# Patient Record
Sex: MALE | Race: WHITE | NOT HISPANIC OR LATINO | URBAN - METROPOLITAN AREA
[De-identification: names, ages, dates, MRNs, and addresses within clinical notes are randomized per-mention and may not be internally consistent; named-entity substitution may affect disease eponyms.]

---

## 2017-06-02 ENCOUNTER — EMERGENCY (EMERGENCY)
Facility: HOSPITAL | Age: 52
LOS: 1 days | Discharge: HOME | End: 2017-06-02
Attending: EMERGENCY MEDICINE
Payer: COMMERCIAL

## 2017-06-02 VITALS
TEMPERATURE: 98 F | DIASTOLIC BLOOD PRESSURE: 87 MMHG | OXYGEN SATURATION: 96 % | RESPIRATION RATE: 18 BRPM | HEART RATE: 86 BPM | SYSTOLIC BLOOD PRESSURE: 135 MMHG

## 2017-06-02 PROCEDURE — 99283 EMERGENCY DEPT VISIT LOW MDM: CPT

## 2017-06-02 PROCEDURE — 73130 X-RAY EXAM OF HAND: CPT | Mod: 26,RT

## 2017-06-02 PROCEDURE — 99283 EMERGENCY DEPT VISIT LOW MDM: CPT | Mod: 25

## 2017-06-02 PROCEDURE — 73130 X-RAY EXAM OF HAND: CPT

## 2017-06-02 RX ORDER — IBUPROFEN 200 MG
600 TABLET ORAL ONCE
Qty: 0 | Refills: 0 | Status: COMPLETED | OUTPATIENT
Start: 2017-06-02 | End: 2017-06-02

## 2017-06-02 RX ADMIN — Medication 600 MILLIGRAM(S): at 19:46

## 2017-06-02 NOTE — ED PROVIDER NOTE - OBJECTIVE STATEMENT
51M presenting with right hand pain after mechanical trip and fall 1 day prior, patient fell onto outstretched right hand. +swelling to mid dorsum of right hand, no numbness/tingling or weakness of right hand. NO forearm or elbow pain on right. NO head injury. Patient has not taken anything for pain yet.

## 2017-06-02 NOTE — ED ADULT NURSE NOTE - OBJECTIVE STATEMENT
50 y/o male presented to the ED c/o rt hand swelling and 5/10 pain on pain scale s/p slip and fall yesterday. Pt is able to move rt hand and Pt has + radial pulse. Pt is A&Ox3, PERRL, lungs CTA B/L, abd. soft, NT, ND, skin W/D/I, MAEx4.

## 2017-06-02 NOTE — ED PROVIDER NOTE - PLAN OF CARE
There was no broken bones on the x-ray of your hand.     You may use Tylenol 650mg every 8 hours or Motrin 600mg every 8 hours as needed for pain.     You may use the wrist guard for comfort. Elevate the arm to reduce swelling.     Please see our orthopedics doctors in 1 week if your pain has not improved

## 2017-06-02 NOTE — ED PROVIDER NOTE - CARE PLAN
Principal Discharge DX:	Hand injury  Instructions for follow-up, activity and diet:	There was no broken bones on the x-ray of your hand.     You may use Tylenol 650mg every 8 hours or Motrin 600mg every 8 hours as needed for pain.     You may use the wrist guard for comfort. Elevate the arm to reduce swelling.     Please see our orthopedics doctors in 1 week if your pain has not improved

## 2017-06-02 NOTE — ED PROVIDER NOTE - MEDICAL DECISION MAKING DETAILS
Attending MD Soler: 51M with pain and swelling over dorsum of right hand, no focal bony ttp, no snuffbox ttp, will obtain XR to rule out hand fx

## 2017-09-22 ENCOUNTER — APPOINTMENT (OUTPATIENT)
Dept: FAMILY MEDICINE | Facility: CLINIC | Age: 52
End: 2017-09-22
Payer: COMMERCIAL

## 2017-09-22 VITALS — OXYGEN SATURATION: 98 % | HEART RATE: 110 BPM

## 2017-09-22 VITALS — TEMPERATURE: 99.9 F | DIASTOLIC BLOOD PRESSURE: 80 MMHG | SYSTOLIC BLOOD PRESSURE: 132 MMHG

## 2017-09-22 PROCEDURE — 99214 OFFICE O/P EST MOD 30 MIN: CPT

## 2017-09-22 PROCEDURE — 87804 INFLUENZA ASSAY W/OPTIC: CPT | Mod: QW

## 2017-09-25 ENCOUNTER — RX RENEWAL (OUTPATIENT)
Age: 52
End: 2017-09-25

## 2017-09-25 LAB
FLUAV SPEC QL CULT: NORMAL
FLUBV AG SPEC QL IA: NORMAL
RAPID RVP RESULT: NOT DETECTED

## 2017-09-26 ENCOUNTER — APPOINTMENT (OUTPATIENT)
Dept: FAMILY MEDICINE | Facility: CLINIC | Age: 52
End: 2017-09-26
Payer: COMMERCIAL

## 2017-09-26 ENCOUNTER — EMERGENCY (EMERGENCY)
Facility: HOSPITAL | Age: 52
LOS: 1 days | Discharge: HOME | End: 2017-09-26
Attending: EMERGENCY MEDICINE | Admitting: EMERGENCY MEDICINE
Payer: COMMERCIAL

## 2017-09-26 VITALS
OXYGEN SATURATION: 98 % | DIASTOLIC BLOOD PRESSURE: 87 MMHG | HEART RATE: 86 BPM | SYSTOLIC BLOOD PRESSURE: 126 MMHG | TEMPERATURE: 98 F | RESPIRATION RATE: 16 BRPM

## 2017-09-26 VITALS
RESPIRATION RATE: 18 BRPM | HEART RATE: 87 BPM | TEMPERATURE: 100 F | SYSTOLIC BLOOD PRESSURE: 113 MMHG | WEIGHT: 199.96 LBS | DIASTOLIC BLOOD PRESSURE: 86 MMHG | HEIGHT: 71 IN | OXYGEN SATURATION: 98 %

## 2017-09-26 VITALS — TEMPERATURE: 98.9 F

## 2017-09-26 DIAGNOSIS — B97.89 ACUTE UPPER RESPIRATORY INFECTION, UNSPECIFIED: ICD-10-CM

## 2017-09-26 DIAGNOSIS — R50.9 FEVER, UNSPECIFIED: ICD-10-CM

## 2017-09-26 DIAGNOSIS — J06.9 ACUTE UPPER RESPIRATORY INFECTION, UNSPECIFIED: ICD-10-CM

## 2017-09-26 LAB
ALBUMIN SERPL ELPH-MCNC: 3.6 G/DL — SIGNIFICANT CHANGE UP (ref 3.3–5)
ALP SERPL-CCNC: 53 U/L — SIGNIFICANT CHANGE UP (ref 40–120)
ALT FLD-CCNC: 15 U/L RC — SIGNIFICANT CHANGE UP (ref 10–45)
ANION GAP SERPL CALC-SCNC: 8 MMOL/L — SIGNIFICANT CHANGE UP (ref 5–17)
AST SERPL-CCNC: 13 U/L — SIGNIFICANT CHANGE UP (ref 10–40)
BASOPHILS # BLD AUTO: 0 K/UL — SIGNIFICANT CHANGE UP (ref 0–0.2)
BASOPHILS NFR BLD AUTO: 0.3 % — SIGNIFICANT CHANGE UP (ref 0–2)
BILIRUB SERPL-MCNC: 0.5 MG/DL — SIGNIFICANT CHANGE UP (ref 0.2–1.2)
BUN SERPL-MCNC: 13 MG/DL — SIGNIFICANT CHANGE UP (ref 7–23)
CALCIUM SERPL-MCNC: 9.3 MG/DL — SIGNIFICANT CHANGE UP (ref 8.4–10.5)
CHLORIDE SERPL-SCNC: 98 MMOL/L — SIGNIFICANT CHANGE UP (ref 96–108)
CO2 SERPL-SCNC: 33 MMOL/L — HIGH (ref 22–31)
CREAT SERPL-MCNC: 1.05 MG/DL — SIGNIFICANT CHANGE UP (ref 0.5–1.3)
CRP SERPL-MCNC: 4.1 MG/DL — HIGH (ref 0–0.4)
EOSINOPHIL # BLD AUTO: 0.1 K/UL — SIGNIFICANT CHANGE UP (ref 0–0.5)
EOSINOPHIL NFR BLD AUTO: 1.8 % — SIGNIFICANT CHANGE UP (ref 0–6)
GLUCOSE SERPL-MCNC: 116 MG/DL — HIGH (ref 70–99)
HCT VFR BLD CALC: 44.8 % — SIGNIFICANT CHANGE UP (ref 39–50)
HGB BLD-MCNC: 15.5 G/DL — SIGNIFICANT CHANGE UP (ref 13–17)
LYMPHOCYTES # BLD AUTO: 1.8 K/UL — SIGNIFICANT CHANGE UP (ref 1–3.3)
LYMPHOCYTES # BLD AUTO: 34.4 % — SIGNIFICANT CHANGE UP (ref 13–44)
MCHC RBC-ENTMCNC: 31.8 PG — SIGNIFICANT CHANGE UP (ref 27–34)
MCHC RBC-ENTMCNC: 34.5 GM/DL — SIGNIFICANT CHANGE UP (ref 32–36)
MCV RBC AUTO: 92.3 FL — SIGNIFICANT CHANGE UP (ref 80–100)
MONOCYTES # BLD AUTO: 0.6 K/UL — SIGNIFICANT CHANGE UP (ref 0–0.9)
MONOCYTES NFR BLD AUTO: 10.6 % — SIGNIFICANT CHANGE UP (ref 2–14)
NEUTROPHILS # BLD AUTO: 2.8 K/UL — SIGNIFICANT CHANGE UP (ref 1.8–7.4)
NEUTROPHILS NFR BLD AUTO: 52.9 % — SIGNIFICANT CHANGE UP (ref 43–77)
PLATELET # BLD AUTO: 203 K/UL — SIGNIFICANT CHANGE UP (ref 150–400)
POTASSIUM SERPL-MCNC: 3.8 MMOL/L — SIGNIFICANT CHANGE UP (ref 3.5–5.3)
POTASSIUM SERPL-SCNC: 3.8 MMOL/L — SIGNIFICANT CHANGE UP (ref 3.5–5.3)
PROT SERPL-MCNC: 7.5 G/DL — SIGNIFICANT CHANGE UP (ref 6–8.3)
RBC # BLD: 4.86 M/UL — SIGNIFICANT CHANGE UP (ref 4.2–5.8)
RBC # FLD: 11.2 % — SIGNIFICANT CHANGE UP (ref 10.3–14.5)
SODIUM SERPL-SCNC: 139 MMOL/L — SIGNIFICANT CHANGE UP (ref 135–145)
WBC # BLD: 5.4 K/UL — SIGNIFICANT CHANGE UP (ref 3.8–10.5)
WBC # FLD AUTO: 5.4 K/UL — SIGNIFICANT CHANGE UP (ref 3.8–10.5)

## 2017-09-26 PROCEDURE — 80053 COMPREHEN METABOLIC PANEL: CPT

## 2017-09-26 PROCEDURE — 99284 EMERGENCY DEPT VISIT MOD MDM: CPT

## 2017-09-26 PROCEDURE — 86140 C-REACTIVE PROTEIN: CPT

## 2017-09-26 PROCEDURE — 85027 COMPLETE CBC AUTOMATED: CPT

## 2017-09-26 PROCEDURE — 99213 OFFICE O/P EST LOW 20 MIN: CPT

## 2017-09-26 PROCEDURE — 85652 RBC SED RATE AUTOMATED: CPT

## 2017-09-26 PROCEDURE — 99283 EMERGENCY DEPT VISIT LOW MDM: CPT

## 2017-09-26 RX ORDER — LIDOCAINE 4 G/100G
5 CREAM TOPICAL
Qty: 60 | Refills: 0 | OUTPATIENT
Start: 2017-09-26

## 2017-09-26 RX ORDER — DIPHENHYDRAMINE HCL 50 MG
10 CAPSULE ORAL ONCE
Qty: 0 | Refills: 0 | Status: COMPLETED | OUTPATIENT
Start: 2017-09-26 | End: 2017-09-26

## 2017-09-26 RX ORDER — LIDOCAINE 4 G/100G
5 CREAM TOPICAL ONCE
Qty: 0 | Refills: 0 | Status: COMPLETED | OUTPATIENT
Start: 2017-09-26 | End: 2017-09-26

## 2017-09-26 RX ADMIN — Medication 10 MILLIGRAM(S): at 17:04

## 2017-09-26 RX ADMIN — Medication 30 MILLILITER(S): at 17:04

## 2017-09-26 RX ADMIN — LIDOCAINE 5 MILLILITER(S): 4 CREAM TOPICAL at 17:04

## 2017-09-26 NOTE — ED PROVIDER NOTE - OBJECTIVE STATEMENT
53 yo w/ no significant pmhx  c/o fever, Tmax 103.9, throat soreness, non-productive cough since 09/20. Pt reports SOB. Denies runny nose, congestion, recent travel, sick contacts, chest pain. Denies nausea, vomiting, diarrrhea, constipation, no urethral discharge. Pt reports losing 8 lbs since the 20th because he can't eat. Dr. Hanna Bonilla, sent patient here to be evaluated, see a throat surgeon as well. 51 yo w/ no significant pmhx  c/o fever, Tmax 103.9, throat soreness, non-productive cough since 09/20. Pt reports SOB. Denies runny nose, congestion, recent travel, sick contacts, chest pain. Denies nausea, vomiting, diarrhea, constipation or urethral discharge. Pt reports losing 8 lbs since the 20th because he can't eat. Dr. Hanna Bonilla, sent patient here to be evaluated. Denies any other symptoms.

## 2017-09-26 NOTE — ED ADULT NURSE NOTE - OBJECTIVE STATEMENT
53 y/o M w/ no significant pmhx, present to ED with fever, Tmax 103.9, throat soreness, non-productive cough since 09/20. Pt reports SOB. Denies runny nose, congestion, recent travel, sick contacts, chest pain. Denies nausea, vomiting, diarrhea, constipation or urethral discharge. Pt reports losing 8 lbs since the 20th because he can't eat. Dr. Hanna Bonilla, sent patient here to be evaluated. Denies any other symptoms.

## 2017-09-26 NOTE — ED PROVIDER NOTE - MEDICAL DECISION MAKING DETAILS
51 yo w/ no significant pmhx  c/o fever, Tmax 103.9, throat soreness, non-productive cough since 09/20. Leukoplakia  vs Viral syndrome ([place])   - Labs

## 2017-09-26 NOTE — ED PROVIDER NOTE - ATTENDING CONTRIBUTION TO CARE
I have examined and evaluated this patient with the above resident or PA, and agree with the documented clinical history, exam and plan.   Briefly: 53 yo M c/o mouth pain for past several days; sent in to ED by his PMD over concern for dental infection; on exam has multiple ulcers within mouth, ulcers present singly, without bleeding or pus; no uvula deviation, no tonsillar enlargement, mild cervical LNA.  Dental consult obtained and agrees reynaldokyl gricel; pt stable for dc, offered lidocaine viscous solution for oral rinsing.

## 2017-09-26 NOTE — ED PROVIDER NOTE - PHYSICAL EXAMINATION
ulcers to the back of the throat and tongue, white on top of the tongue that could not be scraped off, no bleeding, no pus, mildly edematous gum,

## 2017-09-26 NOTE — PROGRESS NOTE ADULT - SUBJECTIVE AND OBJECTIVE BOX
Patient is a 52y old male who presents to the [facility] ED with chief complaint of intraoral pain and mouth sores.    HPI: Patient reports that his symptoms started 1 week ago (last Wednesday) with fever. On Thursday patient began having difficulty eating due to pain in the mouth and throat. Sunday the patient noticed white coloration on his gums and tongue. Patient saw PCP who prescribed Amoxicillin, of which patient has taken 2 doses. Patient presents afebrile but with persistent intraoral and throat pain.      PAST MEDICAL & SURGICAL HISTORY:  No pertinent past medical history  No significant past surgical history    ( -  ) heart valve replacement  ( - ) joint replacement  ( -  ) pregnancy    MEDICATIONS  (STANDING): Magic mouthwash.  MEDICATIONS  (PRN): Ibruprofen PRN fever and oral pain    Allergies:  No Known Allergies    FAMILY HISTORY:  No pertinent family history reported.    SOCIAL HISTORY: Patient presents to ED with his wife. Patient does not smoke.    Last Dental Visit: 1 year ago. Patient reports that he sees dentist annually for cleanings.    Vital Signs Last 24 Hrs  T(C): 36.9 (26 Sep 2017 17:06), Max: 37.6 (26 Sep 2017 14:16)  T(F): 98.4 (26 Sep 2017 17:06), Max: 99.6 (26 Sep 2017 14:16)  HR: 86 (26 Sep 2017 17:06) (86 - 87)  BP: 126/87 (26 Sep 2017 17:06) (113/86 - 126/87)  BP(mean): --  RR: 16 (26 Sep 2017 17:06) (16 - 18)  SpO2: 98% (26 Sep 2017 17:06) (98% - 98%)    EOE:  TMJ ( -  ) clicks                    (  -  ) pops                    (  -  ) crepitus             Mandible FROM             Facial bones and MOM: grossly intact             ( -  ) trismus             ( -  ) LAD             ( -  ) swelling             (  - ) asymmetry             ( -  ) palpation             (  - ) SOB             ( -  ) dysphagia, but patient reports pain when swallowing.    IOE:  Permanent dentition, grossly intact.           Hard/soft palate: Two ulcerations 3mm x 3mm noted on hard palate: 1mm right of midpalatine raphe adjacent to tooth #4, and one 4mm left of the midpalatine raphe on soft palate approximately 1mm posterior to vibrating line. (  - ) palatal torus.            FOM: WNL           Tongue: Dorsum of tongue shows uninterrupted whitish plaque-like appearance that does not wipe off. 2mm x 3mm ulceration with red halo located at tip of tongue 4mm left of midline of tongue.            Labial mucosa: WNL           Buccal mucosa: 2mm x 3mm ulceration with red halo noted in lower left posterior buccal vestibule, approximately adjacent to #19.            Gingiva: Erythematous at gingival margin generalized throughout upper and lower arches. Gingiva apical to this erthyema is whitish and sloughs when rubbed.           (  - ) percussion           ( +  ) palpation: painful when ulcerations and erythematous gingiva palpated.           ( - ) swelling    Radiographs: No radiographs taken at this encounter.    LABS:                15.5   5.4   )-----------( 203      ( 26 Sep 2017 16:01 )             44.8     09-26    139  |  98  |  13  ----------------------------<  116<H>  3.8   |  33<H>  |  1.05    Ca    9.3      26 Sep 2017 16:01    TPro  7.5  /  Alb  3.6  /  TBili  0.5  /  DBili  x   /  AST  13  /  ALT  15  /  AlkPhos  53  09-26    WBC Count: 5.4 K/uL [3.8 - 10.5] (09-26 @ 16:01)    Platelet Count - Automated: 203 K/uL [150 - 400] (09-26 @ 16:01)      ASSESSMENT: 52 year old male presents with symptoms and oral clinical presentation consistent with primary herpetic gingivostomatitis.    PROCEDURE:  Verbal consent given. EOE, IOE performed.    RECOMMENDATIONS:   1) Continue use of magic mouthwash: rinse and spit.  2) OTC pain control.  3) Ensure well hydrated.  4) Avoid hot and spicy foods.  5) Follow up in 1 week with referring PCP and/or outside dentist for comprehensive care.  6) If any acute oral changes occur, including difficulty swallowing, breathing, or worsening fever occur, return to ED.    Sarina Garza DDS. Pager #: [phone].  Oral surgeon consulted: Dr. Nilson Hawley.

## 2017-09-29 LAB — BACTERIA THROAT CULT: NORMAL

## 2017-12-02 ENCOUNTER — APPOINTMENT (OUTPATIENT)
Dept: FAMILY MEDICINE | Facility: CLINIC | Age: 52
End: 2017-12-02
Payer: COMMERCIAL

## 2017-12-02 VITALS — SYSTOLIC BLOOD PRESSURE: 122 MMHG | DIASTOLIC BLOOD PRESSURE: 80 MMHG | TEMPERATURE: 98 F

## 2017-12-02 DIAGNOSIS — R26.89 OTHER ABNORMALITIES OF GAIT AND MOBILITY: ICD-10-CM

## 2017-12-02 PROCEDURE — 99213 OFFICE O/P EST LOW 20 MIN: CPT | Mod: 25

## 2017-12-02 PROCEDURE — 36415 COLL VENOUS BLD VENIPUNCTURE: CPT

## 2017-12-02 RX ORDER — LIDOCAINE HYDROCHLORIDE 20 MG/ML
2 SOLUTION OROPHARYNGEAL
Qty: 100 | Refills: 0 | Status: DISCONTINUED | COMMUNITY
Start: 2017-09-26

## 2017-12-02 RX ORDER — ALBUTEROL SULFATE 90 UG/1
108 (90 BASE) AEROSOL, METERED RESPIRATORY (INHALATION) EVERY 6 HOURS
Qty: 1 | Refills: 1 | Status: DISCONTINUED | COMMUNITY
Start: 2017-09-22 | End: 2017-12-02

## 2017-12-02 RX ORDER — AMOXICILLIN AND CLAVULANATE POTASSIUM 875; 125 MG/1; MG/1
875-125 TABLET, COATED ORAL TWICE DAILY
Qty: 20 | Refills: 0 | Status: DISCONTINUED | COMMUNITY
Start: 2017-09-25 | End: 2017-12-02

## 2017-12-04 LAB
BASOPHILS # BLD AUTO: 0.04 K/UL
BASOPHILS NFR BLD AUTO: 0.7 %
EOSINOPHIL # BLD AUTO: 0.08 K/UL
EOSINOPHIL NFR BLD AUTO: 1.3 %
HCT VFR BLD CALC: 45.5 %
HGB BLD-MCNC: 15.9 G/DL
HSV 1+2 IGG SER IA-IMP: NEGATIVE
HSV 1+2 IGG SER IA-IMP: POSITIVE
HSV1 IGG SER QL: 49.5 INDEX
HSV2 IGG SER QL: 0.43 INDEX
IMM GRANULOCYTES NFR BLD AUTO: 0.2 %
LYMPHOCYTES # BLD AUTO: 1.41 K/UL
LYMPHOCYTES NFR BLD AUTO: 23.5 %
MAN DIFF?: NORMAL
MCHC RBC-ENTMCNC: 31.7 PG
MCHC RBC-ENTMCNC: 34.9 GM/DL
MCV RBC AUTO: 90.6 FL
MONOCYTES # BLD AUTO: 0.42 K/UL
MONOCYTES NFR BLD AUTO: 7 %
NEUTROPHILS # BLD AUTO: 4.05 K/UL
NEUTROPHILS NFR BLD AUTO: 67.3 %
PLATELET # BLD AUTO: 260 K/UL
RBC # BLD: 5.02 M/UL
RBC # FLD: 13.4 %
WBC # FLD AUTO: 6.01 K/UL

## 2017-12-06 LAB
HSV1 IGM SER QL: NORMAL TITER
HSV2 AB FLD-ACNC: NORMAL TITER

## 2017-12-11 ENCOUNTER — OUTPATIENT (OUTPATIENT)
Dept: OUTPATIENT SERVICES | Facility: HOSPITAL | Age: 52
LOS: 1 days | End: 2017-12-11
Payer: COMMERCIAL

## 2017-12-11 ENCOUNTER — APPOINTMENT (OUTPATIENT)
Dept: CT IMAGING | Facility: IMAGING CENTER | Age: 52
End: 2017-12-11
Payer: COMMERCIAL

## 2017-12-11 DIAGNOSIS — Z00.8 ENCOUNTER FOR OTHER GENERAL EXAMINATION: ICD-10-CM

## 2017-12-11 PROCEDURE — 70450 CT HEAD/BRAIN W/O DYE: CPT | Mod: 26

## 2017-12-11 PROCEDURE — 70450 CT HEAD/BRAIN W/O DYE: CPT

## 2017-12-13 ENCOUNTER — CLINICAL ADVICE (OUTPATIENT)
Age: 52
End: 2017-12-13

## 2017-12-13 DIAGNOSIS — R93.0 ABNORMAL FINDINGS ON DIAGNOSTIC IMAGING OF SKULL AND HEAD, NOT ELSEWHERE CLASSIFIED: ICD-10-CM

## 2017-12-18 ENCOUNTER — OUTPATIENT (OUTPATIENT)
Dept: OUTPATIENT SERVICES | Facility: HOSPITAL | Age: 52
LOS: 1 days | End: 2017-12-18
Payer: COMMERCIAL

## 2017-12-18 ENCOUNTER — APPOINTMENT (OUTPATIENT)
Dept: MRI IMAGING | Facility: IMAGING CENTER | Age: 52
End: 2017-12-18
Payer: COMMERCIAL

## 2017-12-18 DIAGNOSIS — R93.0 ABNORMAL FINDINGS ON DIAGNOSTIC IMAGING OF SKULL AND HEAD, NOT ELSEWHERE CLASSIFIED: ICD-10-CM

## 2017-12-18 PROCEDURE — A9585: CPT

## 2017-12-18 PROCEDURE — 70553 MRI BRAIN STEM W/O & W/DYE: CPT | Mod: 26

## 2017-12-18 PROCEDURE — 70553 MRI BRAIN STEM W/O & W/DYE: CPT

## 2018-01-03 ENCOUNTER — APPOINTMENT (OUTPATIENT)
Dept: NEUROLOGY | Facility: CLINIC | Age: 53
End: 2018-01-03
Payer: COMMERCIAL

## 2018-01-03 VITALS
SYSTOLIC BLOOD PRESSURE: 142 MMHG | BODY MASS INDEX: 28.28 KG/M2 | HEART RATE: 72 BPM | HEIGHT: 71 IN | WEIGHT: 202 LBS | DIASTOLIC BLOOD PRESSURE: 93 MMHG

## 2018-01-03 VITALS — DIASTOLIC BLOOD PRESSURE: 78 MMHG | SYSTOLIC BLOOD PRESSURE: 120 MMHG

## 2018-01-03 DIAGNOSIS — S42.209A UNSPECIFIED FRACTURE OF UPPER END OF UNSPECIFIED HUMERUS, INITIAL ENCOUNTER FOR CLOSED FRACTURE: ICD-10-CM

## 2018-01-03 PROCEDURE — 99204 OFFICE O/P NEW MOD 45 MIN: CPT

## 2018-01-03 RX ORDER — ACETAMINOPHEN 325 MG/1
325 TABLET, FILM COATED ORAL
Refills: 0 | Status: ACTIVE | COMMUNITY

## 2018-01-22 ENCOUNTER — APPOINTMENT (OUTPATIENT)
Dept: NEUROLOGY | Facility: CLINIC | Age: 53
End: 2018-01-22

## 2018-04-04 ENCOUNTER — APPOINTMENT (OUTPATIENT)
Dept: NEUROLOGY | Facility: CLINIC | Age: 53
End: 2018-04-04

## 2018-04-19 ENCOUNTER — APPOINTMENT (OUTPATIENT)
Dept: CARDIOLOGY | Facility: CLINIC | Age: 53
End: 2018-04-19

## 2018-04-19 ENCOUNTER — EMERGENCY (EMERGENCY)
Facility: HOSPITAL | Age: 53
LOS: 0 days | Discharge: LEFT BEFORE DISCHARGE | End: 2018-04-19
Attending: EMERGENCY MEDICINE | Admitting: EMERGENCY MEDICINE

## 2018-04-19 VITALS
SYSTOLIC BLOOD PRESSURE: 141 MMHG | DIASTOLIC BLOOD PRESSURE: 75 MMHG | RESPIRATION RATE: 18 BRPM | HEART RATE: 72 BPM | TEMPERATURE: 98 F | OXYGEN SATURATION: 98 %

## 2018-04-19 VITALS
DIASTOLIC BLOOD PRESSURE: 83 MMHG | SYSTOLIC BLOOD PRESSURE: 148 MMHG | OXYGEN SATURATION: 100 % | HEART RATE: 65 BPM | RESPIRATION RATE: 18 BRPM

## 2018-04-19 DIAGNOSIS — R07.89 OTHER CHEST PAIN: ICD-10-CM

## 2018-04-19 DIAGNOSIS — Z79.899 OTHER LONG TERM (CURRENT) DRUG THERAPY: ICD-10-CM

## 2018-04-19 DIAGNOSIS — M79.89 OTHER SPECIFIED SOFT TISSUE DISORDERS: ICD-10-CM

## 2018-04-19 LAB
ALBUMIN SERPL ELPH-MCNC: 4.5 G/DL — SIGNIFICANT CHANGE UP (ref 3.5–5.2)
ALP SERPL-CCNC: 55 U/L — SIGNIFICANT CHANGE UP (ref 30–115)
ALT FLD-CCNC: 15 U/L — SIGNIFICANT CHANGE UP (ref 0–41)
AST SERPL-CCNC: 15 U/L — SIGNIFICANT CHANGE UP (ref 0–41)
BASOPHILS # BLD AUTO: 0.02 K/UL — SIGNIFICANT CHANGE UP (ref 0–0.2)
BASOPHILS NFR BLD AUTO: 0.1 % — SIGNIFICANT CHANGE UP (ref 0–1)
BILIRUB SERPL-MCNC: 0.6 MG/DL — SIGNIFICANT CHANGE UP (ref 0.2–1.2)
BUN SERPL-MCNC: 9 MG/DL — LOW (ref 10–20)
CALCIUM SERPL-MCNC: 9 MG/DL — SIGNIFICANT CHANGE UP (ref 8.5–10.1)
CHLORIDE SERPL-SCNC: 100 MMOL/L — SIGNIFICANT CHANGE UP (ref 98–110)
CHOLEST SERPL-MCNC: 192 MG/DL — SIGNIFICANT CHANGE UP (ref 100–200)
CK MB CFR SERPL CALC: 1.4 NG/ML — SIGNIFICANT CHANGE UP (ref 0.6–6.3)
CK SERPL-CCNC: 58 U/L — SIGNIFICANT CHANGE UP (ref 0–225)
CO2 SERPL-SCNC: 26 MMOL/L — SIGNIFICANT CHANGE UP (ref 17–32)
CREAT SERPL-MCNC: 1 MG/DL — SIGNIFICANT CHANGE UP (ref 0.7–1.5)
EOSINOPHIL # BLD AUTO: 0 K/UL — SIGNIFICANT CHANGE UP (ref 0–0.7)
EOSINOPHIL NFR BLD AUTO: 0 % — SIGNIFICANT CHANGE UP (ref 0–8)
GLUCOSE SERPL-MCNC: 121 MG/DL — HIGH (ref 70–99)
HCT VFR BLD CALC: 44.9 % — SIGNIFICANT CHANGE UP (ref 42–52)
HDLC SERPL-MCNC: 50 MG/DL — SIGNIFICANT CHANGE UP (ref 40–125)
HGB BLD-MCNC: 15.6 G/DL — SIGNIFICANT CHANGE UP (ref 14–18)
IMM GRANULOCYTES NFR BLD AUTO: 0.4 % — HIGH (ref 0.1–0.3)
LIDOCAIN IGE QN: 50 U/L — SIGNIFICANT CHANGE UP (ref 7–60)
LIPID PNL WITH DIRECT LDL SERPL: 130 MG/DL — HIGH (ref 4–129)
LYMPHOCYTES # BLD AUTO: 0.63 K/UL — LOW (ref 1.2–3.4)
LYMPHOCYTES # BLD AUTO: 4.6 % — LOW (ref 20.5–51.1)
MCHC RBC-ENTMCNC: 30.1 PG — SIGNIFICANT CHANGE UP (ref 27–31)
MCHC RBC-ENTMCNC: 34.7 G/DL — SIGNIFICANT CHANGE UP (ref 32–37)
MCV RBC AUTO: 86.7 FL — SIGNIFICANT CHANGE UP (ref 80–94)
MONOCYTES # BLD AUTO: 0.45 K/UL — SIGNIFICANT CHANGE UP (ref 0.1–0.6)
MONOCYTES NFR BLD AUTO: 3.3 % — SIGNIFICANT CHANGE UP (ref 1.7–9.3)
NEUTROPHILS # BLD AUTO: 12.49 K/UL — HIGH (ref 1.4–6.5)
NEUTROPHILS NFR BLD AUTO: 91.6 % — HIGH (ref 42.2–75.2)
NRBC # BLD: 0 /100 WBCS — SIGNIFICANT CHANGE UP (ref 0–0)
PLATELET # BLD AUTO: 251 K/UL — SIGNIFICANT CHANGE UP (ref 130–400)
POTASSIUM SERPL-MCNC: 3.9 MMOL/L — SIGNIFICANT CHANGE UP (ref 3.5–5)
POTASSIUM SERPL-SCNC: 3.9 MMOL/L — SIGNIFICANT CHANGE UP (ref 3.5–5)
PROT SERPL-MCNC: 7 G/DL — SIGNIFICANT CHANGE UP (ref 6–8)
RBC # BLD: 5.18 M/UL — SIGNIFICANT CHANGE UP (ref 4.7–6.1)
RBC # FLD: 11.9 % — SIGNIFICANT CHANGE UP (ref 11.5–14.5)
SODIUM SERPL-SCNC: 139 MMOL/L — SIGNIFICANT CHANGE UP (ref 135–146)
TOTAL CHOLESTEROL/HDL RATIO MEASUREMENT: 3.8 RATIO — LOW (ref 4–5.5)
TRIGL SERPL-MCNC: 93 MG/DL — SIGNIFICANT CHANGE UP (ref 10–149)
TROPONIN T SERPL-MCNC: <0.01 NG/ML — SIGNIFICANT CHANGE UP
TROPONIN T SERPL-MCNC: <0.01 NG/ML — SIGNIFICANT CHANGE UP
WBC # BLD: 13.64 K/UL — HIGH (ref 4.8–10.8)
WBC # FLD AUTO: 13.64 K/UL — HIGH (ref 4.8–10.8)

## 2018-04-19 NOTE — ED ADULT NURSE NOTE - PERIPHERAL VASCULAR WDL
----- Message from Chanel Hager sent at 4/6/2018 10:11 AM CDT -----  Contact: Sabrina [phone] (Nurse)   Sabrina states that she needs order stating that she able to weaning pt off vent through out the day. Please call to advise when ready to be faxed. Thank you.    Pulses equal bilaterally, no edema present.

## 2018-04-19 NOTE — ED PROVIDER NOTE - PROGRESS NOTE DETAILS
Patient and wife have decided not to stay in the hospital for further work-up. He understands that work-up is incomplete and wishes to sign himself out against medical advice. He understands the risks of doing so including but not limited to permanent disability and death. Despite full understanding with verbalization back, he still wishes to sign himself out against medical advice. He states that he will f/u with a cardiologist in [place] tomorrow AM.

## 2018-04-19 NOTE — ED PROVIDER NOTE - ATTENDING CONTRIBUTION TO CARE
53 yo m with no pmh presents with left sided intermittent chest pressure for past few days.  today with chest pressure and lightheadedness on exertion.  no abd pain, no back pain, no numbness, no weakness, no fevers, no chills, no abd pain.  pt was sent in from [facility] for evaluation.  no back pain, no headache, no neck pain, no neck stiffness, no dizziness.  NO SOB.  awake, alert.  neck supple. lungs clear.  abd soft, nontender.  no calf tenderness/edema.  a/p:  exertional chest pain.  p:  labs, ekg, cxr, likely place on oobservation, labs.  pt already full dose aspirin today prior to arrival.

## 2018-04-19 NOTE — ED PROVIDER NOTE - OBJECTIVE STATEMENT
53 y/o M, no significant PMHx, presents to he ED with complaints of chest discomfort x four days. Patient admits to a dull ache along his left chest that has been intermittent without associated dyspnea, nausea, vomiting, abdominal pain, back pain, and lower extremity swelling. He is not a smoker ; denies FHx of CAD. His last stress test was in 2008. He went to an urgent care center today and was advised to come to the ED for further evaluation. 53 y/o M, no significant PMHx, presents to he ED with complaints of chest discomfort x four days. Patient admits to a dull ache along his left chest that has been intermittent without associated dyspnea, nausea, vomiting, abdominal pain, back pain, and lower extremity swelling. He is not a smoker ; denies FHx of CAD. His last stress test was in 2008. He went to an urgent care center today and was advised to come to the ED for further evaluation. Patient took five 81mg ASA today.

## 2018-04-19 NOTE — ED PROVIDER NOTE - MEDICAL DECISION MAKING DETAILS
The initial plan was to put pt in observation for chest pain.  pt had initially agreed to obs for further chest pain work up and evaluation.  pt then changed his mind and only wanted a second trop.  Pt had trop negative x 2.  pt was feeling better.  pt refused obs.  pt and family wanted to go home.  pt signed out ama.  pt understood and accepted all risks.  pt signed out ama.  pt has an appointment with his cardiologist in [place] tomorrow morning and didn't want to stay here.  pt understood importtance of follow up with cardiologist and need for further work up.   The patient wishes to leave against medical advice.  I have discussed the risks, benefits and alternatives (including the possibility of worsening of disease, pain, permanent disability, and/or death) with the patient and his/her family (if available).  The patient voices understanding of these risks, benefits, and alternatives and still wishes to sign out against medical advice.  The patient is awake, alert, oriented  x 3 and has demonstrated capacity to refuse/direct care.  I have advised the patient that they can and should return immediately should they develop any worse/different/additional symptoms, or if they change their mind and want to continue their care.

## 2018-04-20 ENCOUNTER — APPOINTMENT (OUTPATIENT)
Dept: CARDIOLOGY | Facility: CLINIC | Age: 53
End: 2018-04-20
Payer: COMMERCIAL

## 2018-04-20 VITALS
SYSTOLIC BLOOD PRESSURE: 140 MMHG | BODY MASS INDEX: 27.72 KG/M2 | HEIGHT: 71 IN | TEMPERATURE: 97.9 F | OXYGEN SATURATION: 98 % | DIASTOLIC BLOOD PRESSURE: 84 MMHG | WEIGHT: 198 LBS | HEART RATE: 67 BPM

## 2018-04-20 VITALS — SYSTOLIC BLOOD PRESSURE: 138 MMHG | DIASTOLIC BLOOD PRESSURE: 90 MMHG

## 2018-04-20 PROCEDURE — 99245 OFF/OP CONSLTJ NEW/EST HI 55: CPT

## 2018-05-17 ENCOUNTER — FORM ENCOUNTER (OUTPATIENT)
Age: 53
End: 2018-05-17

## 2018-05-17 ENCOUNTER — APPOINTMENT (OUTPATIENT)
Dept: CARDIOLOGY | Facility: CLINIC | Age: 53
End: 2018-05-17
Payer: COMMERCIAL

## 2018-05-17 PROCEDURE — 93015 CV STRESS TEST SUPVJ I&R: CPT

## 2018-05-17 PROCEDURE — 93306 TTE W/DOPPLER COMPLETE: CPT

## 2018-05-18 ENCOUNTER — OUTPATIENT (OUTPATIENT)
Dept: OUTPATIENT SERVICES | Facility: HOSPITAL | Age: 53
LOS: 1 days | End: 2018-05-18
Payer: COMMERCIAL

## 2018-05-18 ENCOUNTER — APPOINTMENT (OUTPATIENT)
Dept: CT IMAGING | Facility: CLINIC | Age: 53
End: 2018-05-18
Payer: COMMERCIAL

## 2018-05-18 DIAGNOSIS — I71.2 THORACIC AORTIC ANEURYSM, WITHOUT RUPTURE: ICD-10-CM

## 2018-05-18 PROCEDURE — 71275 CT ANGIOGRAPHY CHEST: CPT | Mod: 26

## 2018-05-18 PROCEDURE — 71275 CT ANGIOGRAPHY CHEST: CPT

## 2018-12-04 ENCOUNTER — APPOINTMENT (OUTPATIENT)
Dept: FAMILY MEDICINE | Facility: CLINIC | Age: 53
End: 2018-12-04
Payer: COMMERCIAL

## 2018-12-04 VITALS — OXYGEN SATURATION: 98 % | DIASTOLIC BLOOD PRESSURE: 98 MMHG | HEART RATE: 76 BPM | SYSTOLIC BLOOD PRESSURE: 164 MMHG

## 2018-12-04 VITALS — DIASTOLIC BLOOD PRESSURE: 100 MMHG | SYSTOLIC BLOOD PRESSURE: 160 MMHG | TEMPERATURE: 98.4 F

## 2018-12-04 PROCEDURE — 36415 COLL VENOUS BLD VENIPUNCTURE: CPT

## 2018-12-04 PROCEDURE — 99213 OFFICE O/P EST LOW 20 MIN: CPT | Mod: 25

## 2018-12-05 LAB
25(OH)D3 SERPL-MCNC: 27.3 NG/ML
ALBUMIN SERPL ELPH-MCNC: 4.5 G/DL
ALP BLD-CCNC: 62 U/L
ALT SERPL-CCNC: 18 U/L
ANION GAP SERPL CALC-SCNC: 13 MMOL/L
APPEARANCE: CLEAR
AST SERPL-CCNC: 17 U/L
BACTERIA: NEGATIVE
BASOPHILS # BLD AUTO: 0.03 K/UL
BASOPHILS NFR BLD AUTO: 0.5 %
BILIRUB SERPL-MCNC: 0.8 MG/DL
BILIRUBIN URINE: NEGATIVE
BLOOD URINE: NEGATIVE
BUN SERPL-MCNC: 13 MG/DL
CALCIUM SERPL-MCNC: 9.8 MG/DL
CHLORIDE SERPL-SCNC: 103 MMOL/L
CHOLEST SERPL-MCNC: 196 MG/DL
CHOLEST/HDLC SERPL: 3.7 RATIO
CO2 SERPL-SCNC: 26 MMOL/L
COLOR: YELLOW
CREAT SERPL-MCNC: 0.98 MG/DL
EOSINOPHIL # BLD AUTO: 0.06 K/UL
EOSINOPHIL NFR BLD AUTO: 1.1 %
FOLATE SERPL-MCNC: 8.5 NG/ML
GGT SERPL-CCNC: 21 U/L
GLUCOSE QUALITATIVE U: NEGATIVE MG/DL
GLUCOSE SERPL-MCNC: 113 MG/DL
HBA1C MFR BLD HPLC: 5.1 %
HCT VFR BLD CALC: 46.8 %
HDLC SERPL-MCNC: 53 MG/DL
HGB BLD-MCNC: 15.4 G/DL
HYALINE CASTS: 0 /LPF
IMM GRANULOCYTES NFR BLD AUTO: 0.2 %
KETONES URINE: NEGATIVE
LDLC SERPL CALC-MCNC: 120 MG/DL
LEUKOCYTE ESTERASE URINE: NEGATIVE
LYMPHOCYTES # BLD AUTO: 1.4 K/UL
LYMPHOCYTES NFR BLD AUTO: 25.4 %
MAN DIFF?: NORMAL
MCHC RBC-ENTMCNC: 30.5 PG
MCHC RBC-ENTMCNC: 32.9 GM/DL
MCV RBC AUTO: 92.7 FL
MICROSCOPIC-UA: NORMAL
MONOCYTES # BLD AUTO: 0.4 K/UL
MONOCYTES NFR BLD AUTO: 7.3 %
NEUTROPHILS # BLD AUTO: 3.61 K/UL
NEUTROPHILS NFR BLD AUTO: 65.5 %
NITRITE URINE: NEGATIVE
PH URINE: 5.5
PLATELET # BLD AUTO: 265 K/UL
POTASSIUM SERPL-SCNC: 4.6 MMOL/L
PROT SERPL-MCNC: 7.2 G/DL
PROTEIN URINE: NEGATIVE MG/DL
RBC # BLD: 5.05 M/UL
RBC # FLD: 13.2 %
RED BLOOD CELLS URINE: 5 /HPF
SODIUM SERPL-SCNC: 142 MMOL/L
SPECIFIC GRAVITY URINE: 1.02
SQUAMOUS EPITHELIAL CELLS: 0 /HPF
T4 FREE SERPL-MCNC: 1.3 NG/DL
T4 SERPL-MCNC: 7.6 UG/DL
TRIGL SERPL-MCNC: 113 MG/DL
TSH SERPL-ACNC: 1.61 UIU/ML
UROBILINOGEN URINE: NEGATIVE MG/DL
VIT B12 SERPL-MCNC: 333 PG/ML
WBC # FLD AUTO: 5.51 K/UL
WHITE BLOOD CELLS URINE: 1 /HPF

## 2018-12-06 LAB — BACTERIA UR CULT: NORMAL

## 2018-12-10 ENCOUNTER — APPOINTMENT (OUTPATIENT)
Dept: FAMILY MEDICINE | Facility: CLINIC | Age: 53
End: 2018-12-10
Payer: COMMERCIAL

## 2018-12-10 ENCOUNTER — NON-APPOINTMENT (OUTPATIENT)
Age: 53
End: 2018-12-10

## 2018-12-10 VITALS
HEART RATE: 71 BPM | HEIGHT: 71 IN | BODY MASS INDEX: 29.26 KG/M2 | SYSTOLIC BLOOD PRESSURE: 160 MMHG | WEIGHT: 209 LBS | DIASTOLIC BLOOD PRESSURE: 90 MMHG | OXYGEN SATURATION: 97 % | RESPIRATION RATE: 14 BRPM | TEMPERATURE: 98.5 F

## 2018-12-10 DIAGNOSIS — Z00.00 ENCOUNTER FOR GENERAL ADULT MEDICAL EXAMINATION W/OUT ABNORMAL FINDINGS: ICD-10-CM

## 2018-12-10 PROCEDURE — 93000 ELECTROCARDIOGRAM COMPLETE: CPT

## 2018-12-10 PROCEDURE — 99396 PREV VISIT EST AGE 40-64: CPT | Mod: 25

## 2018-12-10 RX ORDER — BUTALBITAL, ACETAMINOPHEN AND CAFFEINE 325; 50; 40 MG/1; MG/1; MG/1
50-325-40 TABLET ORAL 3 TIMES DAILY
Qty: 15 | Refills: 0 | Status: ACTIVE | COMMUNITY
Start: 2018-12-10 | End: 1900-01-01

## 2018-12-17 ENCOUNTER — APPOINTMENT (OUTPATIENT)
Dept: FAMILY MEDICINE | Facility: CLINIC | Age: 53
End: 2018-12-17
Payer: COMMERCIAL

## 2018-12-17 VITALS — SYSTOLIC BLOOD PRESSURE: 130 MMHG | DIASTOLIC BLOOD PRESSURE: 80 MMHG | TEMPERATURE: 98.4 F

## 2018-12-17 VITALS — HEART RATE: 57 BPM | OXYGEN SATURATION: 97 %

## 2018-12-17 DIAGNOSIS — G43.909 MIGRAINE, UNSPECIFIED, NOT INTRACTABLE, W/OUT STATUS MIGRAINOSUS: ICD-10-CM

## 2018-12-17 PROCEDURE — 99214 OFFICE O/P EST MOD 30 MIN: CPT

## 2018-12-17 RX ORDER — CLONIDINE HYDROCHLORIDE 0.1 MG/1
0.1 TABLET ORAL DAILY
Qty: 5 | Refills: 0 | Status: DISCONTINUED | COMMUNITY
Start: 2018-12-04 | End: 2018-12-17

## 2019-01-02 ENCOUNTER — FORM ENCOUNTER (OUTPATIENT)
Age: 54
End: 2019-01-02

## 2019-01-03 ENCOUNTER — APPOINTMENT (OUTPATIENT)
Dept: NEUROLOGY | Facility: CLINIC | Age: 54
End: 2019-01-03
Payer: COMMERCIAL

## 2019-01-03 ENCOUNTER — APPOINTMENT (OUTPATIENT)
Dept: MRI IMAGING | Facility: IMAGING CENTER | Age: 54
End: 2019-01-03
Payer: COMMERCIAL

## 2019-01-03 ENCOUNTER — OUTPATIENT (OUTPATIENT)
Dept: OUTPATIENT SERVICES | Facility: HOSPITAL | Age: 54
LOS: 1 days | End: 2019-01-03
Payer: COMMERCIAL

## 2019-01-03 VITALS
WEIGHT: 200 LBS | SYSTOLIC BLOOD PRESSURE: 136 MMHG | HEART RATE: 77 BPM | BODY MASS INDEX: 28 KG/M2 | HEIGHT: 71 IN | DIASTOLIC BLOOD PRESSURE: 88 MMHG

## 2019-01-03 VITALS — DIASTOLIC BLOOD PRESSURE: 78 MMHG | SYSTOLIC BLOOD PRESSURE: 128 MMHG

## 2019-01-03 DIAGNOSIS — Z78.9 OTHER SPECIFIED HEALTH STATUS: ICD-10-CM

## 2019-01-03 DIAGNOSIS — I71.2 THORACIC AORTIC ANEURYSM, W/OUT RUPTURE: ICD-10-CM

## 2019-01-03 DIAGNOSIS — K13.70 UNSPECIFIED LESIONS OF ORAL MUCOSA: ICD-10-CM

## 2019-01-03 DIAGNOSIS — R51 HEADACHE: ICD-10-CM

## 2019-01-03 PROCEDURE — 70551 MRI BRAIN STEM W/O DYE: CPT

## 2019-01-03 PROCEDURE — 70551 MRI BRAIN STEM W/O DYE: CPT | Mod: 26

## 2019-01-03 PROCEDURE — 99214 OFFICE O/P EST MOD 30 MIN: CPT

## 2019-01-03 RX ORDER — METHYLPREDNISOLONE 4 MG/1
4 TABLET ORAL
Qty: 1 | Refills: 0 | Status: DISCONTINUED | COMMUNITY
Start: 2018-12-17 | End: 2019-01-03

## 2019-01-24 ENCOUNTER — APPOINTMENT (OUTPATIENT)
Dept: FAMILY MEDICINE | Facility: CLINIC | Age: 54
End: 2019-01-24
Payer: COMMERCIAL

## 2019-01-24 VITALS — SYSTOLIC BLOOD PRESSURE: 120 MMHG | DIASTOLIC BLOOD PRESSURE: 80 MMHG | TEMPERATURE: 98.4 F

## 2019-01-24 PROCEDURE — 99213 OFFICE O/P EST LOW 20 MIN: CPT

## 2019-02-07 ENCOUNTER — APPOINTMENT (OUTPATIENT)
Dept: FAMILY MEDICINE | Facility: CLINIC | Age: 54
End: 2019-02-07
Payer: COMMERCIAL

## 2019-02-07 VITALS — DIASTOLIC BLOOD PRESSURE: 80 MMHG | SYSTOLIC BLOOD PRESSURE: 122 MMHG | TEMPERATURE: 98.3 F

## 2019-02-07 PROCEDURE — 99213 OFFICE O/P EST LOW 20 MIN: CPT

## 2019-02-21 VITALS — DIASTOLIC BLOOD PRESSURE: 84 MMHG | SYSTOLIC BLOOD PRESSURE: 128 MMHG

## 2019-02-26 ENCOUNTER — RESULT REVIEW (OUTPATIENT)
Age: 54
End: 2019-02-26

## 2019-02-28 ENCOUNTER — APPOINTMENT (OUTPATIENT)
Dept: FAMILY MEDICINE | Facility: CLINIC | Age: 54
End: 2019-02-28

## 2019-03-25 DIAGNOSIS — R05 COUGH: ICD-10-CM

## 2019-03-25 DIAGNOSIS — G43.109 MIGRAINE WITH AURA, NOT INTRACTABLE, W/OUT STATUS MIGRAINOSUS: ICD-10-CM

## 2019-03-26 ENCOUNTER — APPOINTMENT (OUTPATIENT)
Dept: FAMILY MEDICINE | Facility: CLINIC | Age: 54
End: 2019-03-26
Payer: COMMERCIAL

## 2019-03-26 ENCOUNTER — NON-APPOINTMENT (OUTPATIENT)
Age: 54
End: 2019-03-26

## 2019-03-26 VITALS — HEART RATE: 76 BPM

## 2019-03-26 VITALS — DIASTOLIC BLOOD PRESSURE: 100 MMHG | TEMPERATURE: 97.6 F | SYSTOLIC BLOOD PRESSURE: 138 MMHG

## 2019-03-26 VITALS — DIASTOLIC BLOOD PRESSURE: 90 MMHG | SYSTOLIC BLOOD PRESSURE: 130 MMHG

## 2019-03-26 DIAGNOSIS — R06.09 OTHER FORMS OF DYSPNEA: ICD-10-CM

## 2019-03-26 DIAGNOSIS — M54.2 CERVICALGIA: ICD-10-CM

## 2019-03-26 DIAGNOSIS — K21.9 GASTRO-ESOPHAGEAL REFLUX DISEASE W/OUT ESOPHAGITIS: ICD-10-CM

## 2019-03-26 PROCEDURE — 93000 ELECTROCARDIOGRAM COMPLETE: CPT

## 2019-03-26 PROCEDURE — 36415 COLL VENOUS BLD VENIPUNCTURE: CPT

## 2019-03-26 PROCEDURE — 99214 OFFICE O/P EST MOD 30 MIN: CPT | Mod: 25

## 2019-03-26 RX ORDER — TIZANIDINE 2 MG/1
2 TABLET ORAL
Qty: 14 | Refills: 0 | Status: ACTIVE | COMMUNITY
Start: 2019-03-26 | End: 1900-01-01

## 2019-03-26 RX ORDER — AMOXICILLIN 500 MG/1
500 CAPSULE ORAL
Qty: 21 | Refills: 0 | Status: DISCONTINUED | COMMUNITY
Start: 2019-01-22

## 2019-03-26 RX ORDER — METHYLPREDNISOLONE 4 MG/1
4 TABLET ORAL
Qty: 1 | Refills: 0 | Status: DISCONTINUED | COMMUNITY
Start: 2019-02-21 | End: 2019-03-26

## 2019-03-26 RX ORDER — NAPROXEN 500 MG/1
500 TABLET ORAL
Qty: 28 | Refills: 0 | Status: ACTIVE | COMMUNITY
Start: 2019-03-26 | End: 1900-01-01

## 2019-03-27 LAB
ALBUMIN SERPL ELPH-MCNC: 4.3 G/DL
ALP BLD-CCNC: 64 U/L
ALT SERPL-CCNC: 13 U/L
ANION GAP SERPL CALC-SCNC: 15 MMOL/L
AST SERPL-CCNC: 16 U/L
BASOPHILS # BLD AUTO: 0.03 K/UL
BASOPHILS NFR BLD AUTO: 0.5 %
BILIRUB SERPL-MCNC: 0.4 MG/DL
BUN SERPL-MCNC: 14 MG/DL
CALCIUM SERPL-MCNC: 10 MG/DL
CHLORIDE SERPL-SCNC: 105 MMOL/L
CO2 SERPL-SCNC: 23 MMOL/L
CREAT SERPL-MCNC: 0.92 MG/DL
CRP SERPL-MCNC: 0.11 MG/DL
EOSINOPHIL # BLD AUTO: 0.05 K/UL
EOSINOPHIL NFR BLD AUTO: 0.9 %
ERYTHROCYTE [SEDIMENTATION RATE] IN BLOOD BY WESTERGREN METHOD: 3 MM/HR
GLUCOSE SERPL-MCNC: 91 MG/DL
HCT VFR BLD CALC: 50 %
HGB BLD-MCNC: 16.2 G/DL
IMM GRANULOCYTES NFR BLD AUTO: 0.2 %
IRON SATN MFR SERPL: 30 %
IRON SERPL-MCNC: 109 UG/DL
LYMPHOCYTES # BLD AUTO: 1.29 K/UL
LYMPHOCYTES NFR BLD AUTO: 22.6 %
MAN DIFF?: NORMAL
MCHC RBC-ENTMCNC: 31.2 PG
MCHC RBC-ENTMCNC: 32.4 GM/DL
MCV RBC AUTO: 96.2 FL
MONOCYTES # BLD AUTO: 0.44 K/UL
MONOCYTES NFR BLD AUTO: 7.7 %
NEUTROPHILS # BLD AUTO: 3.89 K/UL
NEUTROPHILS NFR BLD AUTO: 68.1 %
PLATELET # BLD AUTO: 268 K/UL
POTASSIUM SERPL-SCNC: 4.9 MMOL/L
PROT SERPL-MCNC: 7.6 G/DL
RBC # BLD: 5.2 M/UL
RBC # BLD: 5.2 M/UL
RBC # FLD: 12.4 %
RETICS # AUTO: 0.9 %
RETICS AGGREG/RBC NFR: 46.8 K/UL
SODIUM SERPL-SCNC: 143 MMOL/L
TIBC SERPL-MCNC: 365 UG/DL
UIBC SERPL-MCNC: 256 UG/DL
WBC # FLD AUTO: 5.71 K/UL

## 2019-04-04 ENCOUNTER — RX RENEWAL (OUTPATIENT)
Age: 54
End: 2019-04-04

## 2019-04-04 LAB
ANA SER IF-ACNC: NEGATIVE
B BURGDOR AB SER-IMP: NEGATIVE
B BURGDOR IGM PATRN SER IB-IMP: NEGATIVE
B BURGDOR18/20KD IGM SER QL IB: NORMAL
B BURGDOR18KD IGG SER QL IB: NORMAL
B BURGDOR23KD IGG SER QL IB: NORMAL
B BURGDOR23KD IGM SER QL IB: NORMAL
B BURGDOR28KD AB SER QL IB: NORMAL
B BURGDOR28KD IGG SER QL IB: NORMAL
B BURGDOR30KD AB SER QL IB: NORMAL
B BURGDOR30KD IGG SER QL IB: NORMAL
B BURGDOR31KD IGG SER QL IB: NORMAL
B BURGDOR31KD IGM SER QL IB: NORMAL
B BURGDOR39KD IGG SER QL IB: NORMAL
B BURGDOR39KD IGM SER QL IB: NORMAL
B BURGDOR41KD IGG SER QL IB: NORMAL
B BURGDOR41KD IGM SER QL IB: NORMAL
B BURGDOR45KD AB SER QL IB: NORMAL
B BURGDOR45KD IGG SER QL IB: PRESENT
B BURGDOR58KD AB SER QL IB: NORMAL
B BURGDOR58KD IGG SER QL IB: NORMAL
B BURGDOR66KD IGG SER QL IB: NORMAL
B BURGDOR66KD IGM SER QL IB: NORMAL
B BURGDOR93KD IGG SER QL IB: NORMAL
B BURGDOR93KD IGM SER QL IB: NORMAL
B19V IGG SER QL IA: 5.2 INDEX
B19V IGG+IGM SER-IMP: NORMAL
B19V IGG+IGM SER-IMP: POSITIVE
B19V IGM FLD-ACNC: 0.2
B19V IGM SER-ACNC: NEGATIVE
FERRITIN SERPL-MCNC: 654 NG/ML
FOLATE SERPL-MCNC: 9.1 NG/ML
HERPES SIMPLEX 1 DNA: NOT DETECTED
HERPES SIMPLEX 2 DNA: NOT DETECTED
HERPES SIMPLEX SPECIMEN SOURCE: NORMAL
HSV 1+2 IGG SER IA-IMP: NEGATIVE
HSV 1+2 IGG SER IA-IMP: POSITIVE
HSV1 IGG SER QL: 51.2 INDEX
HSV1 IGM SER QL: NORMAL TITER
HSV2 AB FLD-ACNC: NORMAL TITER
HSV2 IGG SER QL: 0.22 INDEX
TRANSFERRIN SERPL-MCNC: 262 MG/DL
TSH SERPL-ACNC: 2.32 UIU/ML
VIT B12 SERPL-MCNC: 295 PG/ML

## 2019-05-13 ENCOUNTER — CLINICAL ADVICE (OUTPATIENT)
Age: 54
End: 2019-05-13

## 2019-05-13 ENCOUNTER — NON-APPOINTMENT (OUTPATIENT)
Age: 54
End: 2019-05-13

## 2019-05-13 ENCOUNTER — APPOINTMENT (OUTPATIENT)
Dept: CARDIOLOGY | Facility: CLINIC | Age: 54
End: 2019-05-13
Payer: COMMERCIAL

## 2019-05-13 VITALS
SYSTOLIC BLOOD PRESSURE: 142 MMHG | WEIGHT: 194 LBS | HEIGHT: 71 IN | BODY MASS INDEX: 27.16 KG/M2 | HEART RATE: 77 BPM | OXYGEN SATURATION: 98 % | DIASTOLIC BLOOD PRESSURE: 84 MMHG

## 2019-05-13 DIAGNOSIS — R52 PAIN, UNSPECIFIED: ICD-10-CM

## 2019-05-13 DIAGNOSIS — I71.9 AORTIC ANEURYSM OF UNSPECIFIED SITE, W/OUT RUPTURE: ICD-10-CM

## 2019-05-13 DIAGNOSIS — I10 ESSENTIAL (PRIMARY) HYPERTENSION: ICD-10-CM

## 2019-05-13 PROCEDURE — 99215 OFFICE O/P EST HI 40 MIN: CPT

## 2019-05-13 PROCEDURE — 93000 ELECTROCARDIOGRAM COMPLETE: CPT

## 2019-05-13 NOTE — REVIEW OF SYSTEMS
[Chest Pain] : chest pain [Palpitations] : no palpitations [Lower Ext Edema] : no extremity edema [Heartburn] : heartburn [see HPI] : see HPI [Negative] : Endocrine

## 2019-05-13 NOTE — PHYSICAL EXAM
[General Appearance - Well Nourished] : well nourished [General Appearance - Well Developed] : well developed [General Appearance - In No Acute Distress] : no acute distress [Normal Conjunctiva] : the conjunctiva exhibited no abnormalities [No Oral Pallor] : no oral pallor [Normal Jugular Venous V Waves Present] : normal jugular venous V waves present [Respiration, Rhythm And Depth] : normal respiratory rhythm and effort [Auscultation Breath Sounds / Voice Sounds] : lungs were clear to auscultation bilaterally [Edema] : no peripheral edema present [Arterial Pulses Normal] : the arterial pulses were normal [Heart Sounds] : normal S1 and S2 [Regular] : the rhythm was regular [Bowel Sounds] : normal bowel sounds [Abdomen Soft] : soft [Abnormal Walk] : normal gait [Cyanosis, Localized] : no localized cyanosis [Skin Turgor] : normal skin turgor [Oriented To Time, Place, And Person] : oriented to person, place, and time [Impaired Insight] : insight and judgment were intact [FreeTextEntry1] : Vitiligo on  hands

## 2019-05-13 NOTE — HISTORY OF PRESENT ILLNESS
[FreeTextEntry1] : He reports chest discomfort beginning in his epigastrium and mid chest.\par No chest pain or dyspena when walking.\par for 2.4 weeks, chest tighness and no other symtoms.\par He stopped his PPI and was previously taking asa 325.qd.\par Now in the middle of a workup for headaches.\par His brother recently had a coronary stent.

## 2019-05-13 NOTE — DISCUSSION/SUMMARY
[FreeTextEntry1] : He is a 53-year-old with recent atypical chest pains and epigastric discomfort. Sounds similar to prior.\par ECG is unchanged.\par Suggested restarting PPI and only taking asa 81 mg qd.\par If his symptoms don’t improve, stress echo would be the best option to find the source of his exertional chest discomfort.\par

## 2019-05-15 ENCOUNTER — RX RENEWAL (OUTPATIENT)
Age: 54
End: 2019-05-15

## 2019-05-15 RX ORDER — LISINOPRIL 20 MG/1
20 TABLET ORAL DAILY
Qty: 7 | Refills: 3 | Status: DISCONTINUED | COMMUNITY
Start: 2018-12-04 | End: 2019-05-15

## 2019-05-17 ENCOUNTER — RX RENEWAL (OUTPATIENT)
Age: 54
End: 2019-05-17

## 2019-05-17 RX ORDER — LANSOPRAZOLE 30 MG/1
30 CAPSULE, DELAYED RELEASE ORAL
Qty: 30 | Refills: 2 | Status: ACTIVE | COMMUNITY
Start: 2019-03-26 | End: 1900-01-01

## 2019-05-27 ENCOUNTER — FORM ENCOUNTER (OUTPATIENT)
Age: 54
End: 2019-05-27

## 2019-05-28 ENCOUNTER — APPOINTMENT (OUTPATIENT)
Dept: CT IMAGING | Facility: CLINIC | Age: 54
End: 2019-05-28
Payer: SELF-PAY

## 2019-05-28 ENCOUNTER — OUTPATIENT (OUTPATIENT)
Dept: OUTPATIENT SERVICES | Facility: HOSPITAL | Age: 54
LOS: 1 days | End: 2019-05-28
Payer: SELF-PAY

## 2019-05-28 DIAGNOSIS — R07.9 CHEST PAIN, UNSPECIFIED: ICD-10-CM

## 2019-05-28 PROCEDURE — 75571 CT HRT W/O DYE W/CA TEST: CPT | Mod: 26

## 2019-05-28 PROCEDURE — 75571 CT HRT W/O DYE W/CA TEST: CPT

## 2019-06-04 NOTE — ED PROVIDER NOTE - DURATION
day(s) Mid-Level Procedure Text (D): After obtaining clear surgical margins the patient was sent to a mid-level provider for surgical repair.  The patient understands they will receive post-surgical care and follow-up from the mid-level provider.

## 2019-06-06 ENCOUNTER — RX RENEWAL (OUTPATIENT)
Age: 54
End: 2019-06-06

## 2019-06-06 RX ORDER — OMEPRAZOLE 40 MG/1
40 CAPSULE, DELAYED RELEASE ORAL DAILY
Qty: 30 | Refills: 1 | Status: ACTIVE | COMMUNITY
Start: 2019-06-06 | End: 1900-01-01

## 2019-06-14 DIAGNOSIS — R07.9 CHEST PAIN, UNSPECIFIED: ICD-10-CM

## 2019-06-21 ENCOUNTER — APPOINTMENT (OUTPATIENT)
Dept: CARDIOLOGY | Facility: CLINIC | Age: 54
End: 2019-06-21

## 2019-09-16 ENCOUNTER — RX RENEWAL (OUTPATIENT)
Age: 54
End: 2019-09-16

## 2019-09-16 RX ORDER — LISINOPRIL 10 MG/1
10 TABLET ORAL
Qty: 30 | Refills: 1 | Status: ACTIVE | COMMUNITY
Start: 2019-04-04 | End: 1900-01-01

## 2020-12-02 ENCOUNTER — APPOINTMENT (OUTPATIENT)
Dept: MRI IMAGING | Facility: CLINIC | Age: 55
End: 2020-12-02
Payer: COMMERCIAL

## 2020-12-02 ENCOUNTER — OUTPATIENT (OUTPATIENT)
Dept: OUTPATIENT SERVICES | Facility: HOSPITAL | Age: 55
LOS: 1 days | End: 2020-12-02
Payer: COMMERCIAL

## 2020-12-02 DIAGNOSIS — Z00.8 ENCOUNTER FOR OTHER GENERAL EXAMINATION: ICD-10-CM

## 2020-12-02 PROCEDURE — 72148 MRI LUMBAR SPINE W/O DYE: CPT

## 2020-12-02 PROCEDURE — 72148 MRI LUMBAR SPINE W/O DYE: CPT | Mod: 26

## 2020-12-13 ENCOUNTER — APPOINTMENT (OUTPATIENT)
Dept: MRI IMAGING | Facility: CLINIC | Age: 55
End: 2020-12-13

## 2020-12-15 PROBLEM — J06.9 VIRAL UPPER RESPIRATORY TRACT INFECTION WITH COUGH: Status: RESOLVED | Noted: 2017-09-26 | Resolved: 2020-12-15

## 2021-07-25 ENCOUNTER — TRANSCRIPTION ENCOUNTER (OUTPATIENT)
Age: 56
End: 2021-07-25

## 2022-10-13 NOTE — ED PROVIDER NOTE - PHYSICAL EXAMINATION
Doxycycline Pregnancy And Lactation Text: This medication is Pregnancy Category D and not consider safe during pregnancy. It is also excreted in breast milk but is considered safe for shorter treatment courses. no midline spinal ttp, nontender right shoulder, nontender right elbow, nontender right forearm. mild swelling over mid dorsum of right hand. NO NV compromise of right hand or digits. no snuffbox ttp of right hand

## 2023-07-31 NOTE — ED PROVIDER NOTE - PSYCHIATRIC, MLM
supervision Alert and oriented to person, place, time/situation. normal mood and affect. no apparent risk to self or others.

## 2025-06-01 ENCOUNTER — NON-APPOINTMENT (OUTPATIENT)
Age: 60
End: 2025-06-01

## 2025-06-07 ENCOUNTER — NON-APPOINTMENT (OUTPATIENT)
Age: 60
End: 2025-06-07